# Patient Record
Sex: MALE | Race: BLACK OR AFRICAN AMERICAN | Employment: FULL TIME | URBAN - METROPOLITAN AREA
[De-identification: names, ages, dates, MRNs, and addresses within clinical notes are randomized per-mention and may not be internally consistent; named-entity substitution may affect disease eponyms.]

---

## 2019-01-27 ENCOUNTER — HOSPITAL ENCOUNTER (EMERGENCY)
Age: 39
Discharge: HOME | End: 2019-01-28
Attending: EMERGENCY MEDICINE
Payer: COMMERCIAL

## 2019-01-27 VITALS
HEART RATE: 74 BPM | HEIGHT: 66 IN | TEMPERATURE: 97.6 F | BODY MASS INDEX: 25.37 KG/M2 | OXYGEN SATURATION: 98 % | RESPIRATION RATE: 16 BRPM | SYSTOLIC BLOOD PRESSURE: 176 MMHG | DIASTOLIC BLOOD PRESSURE: 114 MMHG

## 2019-01-27 DIAGNOSIS — Z00.8 MEDICAL CLEARANCE FOR INCARCERATION: Primary | ICD-10-CM

## 2019-01-27 PROCEDURE — 99282 EMERGENCY DEPT VISIT SF MDM: CPT

## 2019-01-28 NOTE — ED PROVIDER NOTES
I did not see this patient nor did I have any contact with this patient. Pre nurse report, Jeniffer Diaz brought patent here for blood draw for etoh. Patient refused the blood draw and we notified police that we are unable to perform the test without the patient's consent so they left with the patient. .  I am signing off this note to indicate only why my name appeared in the record.  
[facility] Staff, MD

## 2020-10-22 ENCOUNTER — HOSPITAL ENCOUNTER (OUTPATIENT)
Dept: GENERAL RADIOLOGY | Age: 40
Discharge: HOME | End: 2020-10-22
Payer: COMMERCIAL

## 2020-10-22 ENCOUNTER — TRANSCRIBE ORDER (OUTPATIENT)
Dept: REGISTRATION | Age: 40
End: 2020-10-22

## 2020-10-22 DIAGNOSIS — S86.911A STRAIN OF RIGHT KNEE AND LEG: ICD-10-CM

## 2020-10-22 DIAGNOSIS — S86.911A STRAIN OF RIGHT KNEE AND LEG: Primary | ICD-10-CM

## 2020-10-22 PROCEDURE — 73564 X-RAY EXAM KNEE 4 OR MORE: CPT

## 2025-02-07 ENCOUNTER — HOSPITAL ENCOUNTER (EMERGENCY)
Facility: HOSPITAL | Age: 45
Discharge: HOME | End: 2025-02-07

## 2025-02-07 ENCOUNTER — APPOINTMENT (OUTPATIENT)
Facility: HOSPITAL | Age: 45
End: 2025-02-07

## 2025-02-07 VITALS
WEIGHT: 167.55 LBS | DIASTOLIC BLOOD PRESSURE: 96 MMHG | TEMPERATURE: 98.3 F | BODY MASS INDEX: 26.93 KG/M2 | HEIGHT: 66 IN | HEART RATE: 77 BPM | RESPIRATION RATE: 18 BRPM | OXYGEN SATURATION: 99 % | SYSTOLIC BLOOD PRESSURE: 160 MMHG

## 2025-02-07 DIAGNOSIS — R05.1 ACUTE COUGH: Primary | ICD-10-CM

## 2025-02-07 DIAGNOSIS — J45.20 MILD INTERMITTENT ASTHMA WITHOUT COMPLICATION: ICD-10-CM

## 2025-02-07 LAB
FLUAV RNA SPEC QL NAA+PROBE: NOT DETECTED
FLUBV RNA SPEC QL NAA+PROBE: NOT DETECTED
SARS-COV-2 RNA RESP QL NAA+PROBE: NOT DETECTED
SOURCE: NORMAL

## 2025-02-07 PROCEDURE — 99284 EMERGENCY DEPT VISIT MOD MDM: CPT

## 2025-02-07 PROCEDURE — 87636 SARSCOV2 & INF A&B AMP PRB: CPT

## 2025-02-07 PROCEDURE — 71046 X-RAY EXAM CHEST 2 VIEWS: CPT

## 2025-02-07 RX ORDER — ALBUTEROL SULFATE 90 UG/1
2 INHALANT RESPIRATORY (INHALATION) 4 TIMES DAILY PRN
Qty: 18 G | Refills: 0 | Status: SHIPPED | OUTPATIENT
Start: 2025-02-07

## 2025-02-07 RX ORDER — BENZONATATE 200 MG/1
200 CAPSULE ORAL 3 TIMES DAILY PRN
Qty: 30 CAPSULE | Refills: 0 | Status: SHIPPED | OUTPATIENT
Start: 2025-02-07 | End: 2025-02-17

## 2025-02-07 ASSESSMENT — PAIN SCALES - GENERAL: PAINLEVEL_OUTOF10: 0

## 2025-02-07 ASSESSMENT — PAIN - FUNCTIONAL ASSESSMENT: PAIN_FUNCTIONAL_ASSESSMENT: 0-10

## 2025-02-07 NOTE — ED NOTES
Patient here with c/o cold symptoms, with possible aspiration. 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  Patient states that he choked while eating it, with fear of inhaling some of the meat of the sandwich.  Patient states \"I haven't felt right ever since then, it has felt almost like I got a cold.\"  Patient states \"it feels like I've got a piece of it way up in my nose.\"  Patient denies fevers, patient states his brother and several other friends have had the flu, but states that denies contact with them other than via telephone.        Emergency Department Nursing Plan of Care       The Nursing Plan of Care is developed from the Nursing assessment and Emergency Department Attending provider initial evaluation.  The plan of care may be reviewed in the “ED Provider note”.      The Plan of Care was developed with the following considerations:  Patient / Family readiness to learn indicated by:verbalized understanding  Persons(s) to be included in education: patient  Barriers to Learning/Limitations:None      Signed     Irena Reyes RN    2/7/2025   12:22 PM

## 2025-02-07 NOTE — ED TRIAGE NOTES
Pt reports intermittent cough, nasal congestion. Pt also reports recently inhaling a piece of meat that feels like it is still stuck. Pt speaking in complete sentences, airway intact. Pt reports taking dayquil, zyrtec, vitamins without relief

## 2025-02-07 NOTE — ED PROVIDER NOTES
[facility] EMERGENCY DEPARTMENT  EMERGENCY DEPARTMENT ENCOUNTER         Pt Name: Yuriy Odell  MRN: [id number]  Birthdate 1980  Date of evaluation: 2/7/2025  Provider: Yamile Palencia PA-C   PCP: Jose Louis MD  Note Started: 12:32 PM EST 2/7/25     CHIEF COMPLAINT       Chief Complaint   Patient presents with    Cold Symptoms    Aspiration        HISTORY OF PRESENT ILLNESS: 1 or more elements      History From: Patient  HPI Limitations: None     Yuriy Odell is a 44 y.o. male who presents with cough and nasal congestion after he states he accidentally inhaled a piece of meat and felt like it was stuck for couple days.  He states the symptoms went away but then he started having a cough.  He denies any shortness of breath.  He does have a history of asthma but states how he is feeling currently does not feel like a normal \"sickness.\"  He has been taking DayQuil, Zyrtec and over-the-counter vitamins.  He has not had any fevers, chills, nausea, vomiting.     Nursing Notes were all reviewed and agreed with or any disagreements were addressed in the HPI.  Please see MDM for additional details of HPI and ROS     REVIEW OF SYSTEMS      Review of Systems     Positives and Pertinent negatives as per HPI.    PAST HISTORY     Past Medical History:  Past Medical History:   Diagnosis Date    Asthma        Past Surgical History:  History reviewed. No pertinent surgical history.    Family History:  History reviewed. No pertinent family history.    Social History:  Social History     Tobacco Use    Smoking status: Never   Substance Use Topics    Alcohol use: Yes     Alcohol/week: 5.0 standard drinks of alcohol     Types: 5 Standard drinks or equivalent per week     Comment: rare    Drug use: Yes     Types: Marijuana (Weed)       Allergies:  Allergies   Allergen Reactions    Penicillins Hives       CURRENT MEDICATIONS      Discharge Medication List as of 2/7/2025  1:30 PM          PHYSICAL EXAM      ED Triage